# Patient Record
(demographics unavailable — no encounter records)

---

## 2024-11-25 NOTE — HISTORY OF PRESENT ILLNESS
[FreeTextEntry1] : 25 yo  with hx of one term preg, one spon miss had done the better part of a yr doing cycles with letrozole, she conceived her first child on this on first cycle pt then took a 2 month break from letrozole and has been bleeding most of the month after stopping her ALEXANDRA had recomended several months off on a low dose OCP

## 2024-11-25 NOTE — PROCEDURE
[Abnormal Uterine Bleeding] : abnormal uterine bleeding [Transvaginal Ultrasound] : transvaginal ultrasound [FreeTextEntry3] : uterus small and anterverted,  the endometrium is homogenous, thin and normal in appearance both ovaries seen and they contain mylitple small cysts bilat. No FF in the pelvis

## 2024-11-25 NOTE — COUNSELING
[Nutrition/ Exercise/ Weight Management] : nutrition, exercise, weight management [Preconception Care/ Fertility options] : preconception care, fertility options [Pregnancy Options] : pregnancy options

## 2024-11-25 NOTE — PHYSICAL EXAM
[Chaperone Present] : A chaperone was present in the examining room during all aspects of the physical examination [[id number]] : A chaperone was present during the pelvic exam. [Soft] : soft [Non-tender] : non-tender [Non-distended] : non-distended [No Mass] : no mass [Oriented x3] : oriented x3 [Examination Of The Breasts] : a normal appearance [No Masses] : no breast masses were palpable [Labia Majora] : normal [Labia Minora] : normal [Moderate] : There was moderate vaginal bleeding [Normal] : normal [Uterine Adnexae] : normal

## 2024-11-25 NOTE — PLAN
[FreeTextEntry1] : [employer] pt having a dysfunctional period at the moment discussed options to treat will start on a low dose PC to regulate for several months, which agrees with the [facility] recommendation she will return to them when she is ready to consider trying to conceive again ret 1 yr or PRN